# Patient Record
Sex: FEMALE | Race: WHITE | Employment: UNEMPLOYED | URBAN - METROPOLITAN AREA
[De-identification: names, ages, dates, MRNs, and addresses within clinical notes are randomized per-mention and may not be internally consistent; named-entity substitution may affect disease eponyms.]

---

## 2019-12-16 ENCOUNTER — HOSPITAL ENCOUNTER (EMERGENCY)
Facility: CLINIC | Age: 34
Discharge: HOME | End: 2019-12-16
Attending: EMERGENCY MEDICINE | Admitting: EMERGENCY MEDICINE
Payer: COMMERCIAL

## 2019-12-16 VITALS
DIASTOLIC BLOOD PRESSURE: 89 MMHG | HEART RATE: 93 BPM | OXYGEN SATURATION: 98 % | SYSTOLIC BLOOD PRESSURE: 130 MMHG | TEMPERATURE: 98.1 F | RESPIRATION RATE: 16 BRPM

## 2019-12-16 DIAGNOSIS — H53.9 VISUAL AURA: ICD-10-CM

## 2019-12-16 PROCEDURE — 99282 EMERGENCY DEPT VISIT SF MDM: CPT

## 2019-12-16 SDOH — HEALTH STABILITY: MENTAL HEALTH: HOW OFTEN DO YOU HAVE A DRINK CONTAINING ALCOHOL?: NEVER

## 2019-12-16 ASSESSMENT — ENCOUNTER SYMPTOMS
SPEECH DIFFICULTY: 0
HEADACHES: 0
NUMBNESS: 0
FEVER: 0
NAUSEA: 0
VOMITING: 0

## 2019-12-16 NOTE — DISCHARGE INSTRUCTIONS
"Visual aura -- A visual aura classically begins as a small area of visual loss often just lateral to the point of visual fixation. It may either appear as a bright spot or as an area of visual loss. Over the following five minutes to one hour, the visual disturbance expands to involve a quadrant or hemifield of vision. Along the expanding margin geometric shapes or zigzagging lines often appear. The shapes account for one of the common names for aura, the \"fortification spectrum,\" because of the resemblance of the aura to the walls of a medieval fortress. The positive visual phenomena may assume a sickle or C-shape, expanding over time toward the peripheral visual field, leaving a scotoma or area of complete visual loss in their wake. As the aura moves off into the peripheral visual field, it often assumes a shimmering or scintillating quality. As the aura resolves, vision usually returns first to the areas of central vision initially affected by the aura    Diagnosis: Visual Aura  Plan: Monitor symptoms. Take tylenol and ibuprofen when you get home to prevent any migraine symptoms from getting worse.   Return Precautions: See below    Please read the remainder of your discharge instructions for more information.       Discharge Instructions  Migraine    You were seen today for a headache that your provider thinks is likely a migraine. At this time your provider does not find that your headache is a sign of anything dangerous or life-threatening.  However, sometimes the signs of serious illness do not show up right away.      Generally, every Emergency Department visit should have a follow-up clinic visit with either a primary or a specialty clinic/provider. Please follow-up as instructed by your emergency provider today.    Return to the Emergency Department if:  You get a fever of 100.4 F or higher.  You get a stiff neck with your headache.  You get a new headache that is different or worse than headaches you have " had before.  You are vomiting (throwing up) and cannot keep food or water down.  You have blurry or double vision or other problems with your eyes.  You have a new weakness on one side of your body.  You have difficulty with balance which is new.  You or your family thinks you are confused.  You have a seizure.    Treatment:  Often, treatment for your migraine will take some time to make you headache stop.  Going home to sleep can be very effective.  Use your medications as directed; overuse of medications can actually cause headaches.  Once your headache has gone away, avoid triggers such as certain foods, skipping meals, bright lights, changes in sleep, exercise and stress.  Migraine headaches can have symptoms before the pain starts, like vision changes, funny smells/tastes, dizziness or other symptoms.  Treating a headache as soon as the first symptoms come on is very important and gives the best chance of stopping the headache.  If headaches are severe or frequent, you may need to start daily medication to prevent the headaches.  Carbon monoxide can cause headaches, so not burning things in your home is important.  Also get a carbon monoxide detector.  Some medications for migraines may raise your blood pressure, so use with caution if you have high blood pressure or heart problems.  If you were given a prescription for medicine here today, be sure to read all of the information (including the package insert) that comes with your prescription.  This will include important information about the medicine, its side effects, and any warnings that you need to know about.  The pharmacist who fills the prescription can provide more information and answer questions you may have about the medicine.  If you have questions or concerns that the pharmacist cannot address, please call or return to the Emergency Department.   Remember that you can always come back to the Emergency Department if you are not able to see your  regular provider in the amount of time listed above, if you get any new symptoms, or if there is anything that worries you.

## 2019-12-16 NOTE — ED TRIAGE NOTES
"Pt presents after having vision changes in her L. Eye around 1115 that lasted for approx. 20 min. Pt reports a \"crescent-like\" shape in her L. Eye. Denies numbness/tingling. CMS intact. Pt developed L. Eye pressure after her vision changes went away. Pain 2/10. Hx of migraines with the last one being 2 weeks ago  "

## 2019-12-16 NOTE — ED AVS SNAPSHOT
[facility] Emergency Department  [address]  [facility] [address]  Phone:  [phone]  Fax:  [phone]                                    Madiha Olmedo   MRN: [id number]    Department:  [facility] Emergency Department   Date of Visit:  12/16/2019           After Visit Summary Signature Page    I have received my discharge instructions, and my questions have been answered. I have discussed any challenges I see with this plan with the nurse or doctor.    ..........................................................................................................................................  Patient/Patient Representative Signature      ..........................................................................................................................................  Patient Representative Print Name and Relationship to Patient    ..................................................               ................................................  Date                                   Time    ..........................................................................................................................................  Reviewed by Signature/Title    ...................................................              ..............................................  Date                                               Time          22EPIC Rev 08/18

## 2019-12-16 NOTE — ED PROVIDER NOTES
"History     Chief Complaint:  Eye Problem     HPI  Madiha Olmedo is a 34 year old female with history of migraines who presents to the emergency department today with an eye issues. The patient states that she was reading a book when she noticed some blurriness in her left visual field. She states closed her eyes and noticed a spot, about the size of a hole punch, that was multicolored. She states she then had a \"crescent-like\" shape that took up the left visual field in both eyes. Currently, she states no visual issues and after the visual issues dissipated, she developed left temple pressure with severity of 1-2/10. She states she has had 1 aura with a previous migraine, but the migraine had come first. She states today has been a normal day and the only recent change was altering her diet. She states her period has been abnormal this last cycle. She denies a headache, numbness, tingling, speech issues, nausea, vomiting, or fever.     Allergies:  No Known Allergies     Medications:    The patient is not currently taking any prescribed medications.    Past Medical History:    Gestational diabetes  Migraines    Past Surgical History:    History reviewed. No pertinent surgical history.     Family History:    History reviewed. No pertinent family history.     Social History:  The patient was accompanied to the ED with family.  PCP: [facility]   Marital Status:      Review of Systems   Constitutional: Negative for fever.   Eyes: Positive for visual disturbance.   Gastrointestinal: Negative for nausea and vomiting.   Neurological: Negative for speech difficulty, numbness and headaches.   All other systems reviewed and are negative.       Physical Exam     Patient Vitals for the past 24 hrs:   BP Temp Temp src Pulse Heart Rate Resp SpO2   12/16/19 1216 (!) 174/105 98.1  F (36.7  C) Oral 106 106 16 98 %        Physical Exam   Nursing note and vitals reviewed.  Constitutional: Cooperative.   HENT: "   Mouth/Throat: Moist mucous membranes.   Eyes: EOMI, nonicteric sclera.  Fundi visualized bilaterally with no evidence of papilledema or retinal detachment.  Cardiovascular: Normal rate, regular rhythm, no murmurs, rubs, or gallops  Pulmonary/Chest: Effort normal and breath sounds normal. No respiratory distress. No wheezes. No rales.   Abdominal: Soft. Nontender, nondistended, no guarding or rigidity. BS present.   Musculoskeletal: Normal range of motion.   Neurological: Alert. Moves all extremities spontaneously.     CN's II-XII intact. 5/5 BUE and BLE strength. PERRL    EOMI without nystagmus.      Sensation intact to light touch. Negative pronator drift.    Finger to nose intact.   Skin: Skin is warm and dry. No rash noted.   Psychiatric: Normal mood and affect.       Emergency Department Course     Emergency Department Course:  Nursing notes and vitals reviewed. (12:07 PM) I performed an exam of the patient as documented above.     Findings and plan explained to the Patient. Patient discharged home with instructions regarding supportive care, medications, and reasons to return. The importance of close follow-up was reviewed.     Impression & Plan       Medical Decision Making:  Madiha Olmedo is a 34 year old female who presents with vision changes.  These have already resolved.  Her neurologic exam is normal.  She does report a mild headache.  Differential would include stroke versus TIA versus aura.  Given her age, history of migraines, and her normal exam, I favor visual aura.  In fact, her description of her symptoms is classic for her visual auras are typically described including the multicolored nature, as well as the change to involve a crescent from a smaller area.  Discussed this with patient and she voices understanding.  Plan would be to follow-up with ophthalmology if any return of vision symptoms, otherwise follow-up with primary care physician for recheck later this week.  She is discharged in  stable condition.  All questions answered and she is in agreement with the plan.      Diagnosis:    ICD-10-CM    1. Visual aura H53.9       Disposition:  Discharged to home.     Scribe Disclosure:  I, Nasim Orr, am serving as a scribe at 12:07 PM on 12/16/2019 to document services personally performed by Jude Almaraz MD based on my observations and the provider's statements to me.      12/16/2019   [facility] EMERGENCY DEPARTMENT       Jude Almaraz MD  12/17/19 9081